# Patient Record
Sex: MALE
[De-identification: names, ages, dates, MRNs, and addresses within clinical notes are randomized per-mention and may not be internally consistent; named-entity substitution may affect disease eponyms.]

---

## 2020-02-22 ENCOUNTER — HOSPITAL ENCOUNTER (INPATIENT)
Dept: HOSPITAL 53 - M ED | Age: 20
LOS: 9 days | Discharge: HOME | DRG: 885 | End: 2020-03-02
Attending: PSYCHIATRY & NEUROLOGY | Admitting: PSYCHIATRY & NEUROLOGY
Payer: COMMERCIAL

## 2020-02-22 VITALS — WEIGHT: 138.23 LBS | BODY MASS INDEX: 23.03 KG/M2 | HEIGHT: 65 IN

## 2020-02-22 VITALS — DIASTOLIC BLOOD PRESSURE: 91 MMHG | SYSTOLIC BLOOD PRESSURE: 157 MMHG

## 2020-02-22 DIAGNOSIS — Z79.899: ICD-10-CM

## 2020-02-22 DIAGNOSIS — Z62.810: ICD-10-CM

## 2020-02-22 DIAGNOSIS — G47.00: ICD-10-CM

## 2020-02-22 DIAGNOSIS — Z91.5: ICD-10-CM

## 2020-02-22 DIAGNOSIS — F43.12: ICD-10-CM

## 2020-02-22 DIAGNOSIS — F33.1: Primary | ICD-10-CM

## 2020-02-22 LAB
ALBUMIN SERPL BCG-MCNC: 4.4 GM/DL (ref 3.2–5.2)
ALT SERPL W P-5'-P-CCNC: 31 U/L (ref 12–78)
AMPHETAMINES UR QL SCN: NEGATIVE
APAP SERPL-MCNC: < 2 UG/ML (ref 10–30)
BARBITURATES UR QL SCN: NEGATIVE
BENZODIAZ UR QL SCN: NEGATIVE
BILIRUB CONJ SERPL-MCNC: 0.2 MG/DL (ref 0–0.2)
BILIRUB SERPL-MCNC: 0.7 MG/DL (ref 0.2–1)
BUN SERPL-MCNC: 13 MG/DL (ref 7–18)
BZE UR QL SCN: NEGATIVE
CALCIUM SERPL-MCNC: 8.7 MG/DL (ref 8.5–10.1)
CANNABINOIDS UR QL SCN: NEGATIVE
CHLORIDE SERPL-SCNC: 106 MEQ/L (ref 98–107)
CO2 SERPL-SCNC: 29 MEQ/L (ref 21–32)
CREAT SERPL-MCNC: 0.91 MG/DL (ref 0.7–1.3)
ETHANOL SERPL-MCNC: < 0.003 % (ref 0–0.01)
GLUCOSE SERPL-MCNC: 91 MG/DL (ref 70–100)
HCT VFR BLD AUTO: 45 % (ref 42–52)
HGB BLD-MCNC: 15.5 G/DL (ref 13.5–17.5)
MCH RBC QN AUTO: 30.7 PG (ref 27–33)
MCHC RBC AUTO-ENTMCNC: 34.4 G/DL (ref 32–36.5)
MCV RBC AUTO: 89.1 FL (ref 80–96)
METHADONE UR QL SCN: NEGATIVE
OPIATES UR QL SCN: NEGATIVE
PCP UR QL SCN: NEGATIVE
PLATELET # BLD AUTO: 289 10^3/UL (ref 150–450)
POTASSIUM SERPL-SCNC: 4 MEQ/L (ref 3.5–5.1)
PROT SERPL-MCNC: 8 GM/DL (ref 6.4–8.2)
RBC # BLD AUTO: 5.05 10^6/UL (ref 4.3–6.1)
SALICYLATES SERPL-MCNC: < 1.7 MG/DL (ref 5–30)
SODIUM SERPL-SCNC: 140 MEQ/L (ref 136–145)
TSH SERPL DL<=0.005 MIU/L-ACNC: 0.99 UIU/ML (ref 0.46–3.98)
WBC # BLD AUTO: 5.8 10^3/UL (ref 4–10)

## 2020-02-22 RX ADMIN — SERTRALINE HYDROCHLORIDE SCH MG: 50 TABLET ORAL at 22:07

## 2020-02-23 VITALS — SYSTOLIC BLOOD PRESSURE: 140 MMHG | DIASTOLIC BLOOD PRESSURE: 78 MMHG

## 2020-02-23 VITALS — SYSTOLIC BLOOD PRESSURE: 150 MMHG | DIASTOLIC BLOOD PRESSURE: 83 MMHG

## 2020-02-23 RX ADMIN — SERTRALINE HYDROCHLORIDE SCH MG: 50 TABLET ORAL at 20:39
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
## 2020-02-23 NOTE — HPEPDOC
[facility] Medical History & Physical


Date of Admission


Feb 23, 2020


Date of Service:  Feb 23, 2020





History and Physical


CHIEF COMPLAINT: Medical H&P for inpatient mental health unit patient





HISTORY OF PRESENT ILLNESS: 18 yo male for suicidal ideation, no medical 

complaints at this time, denies any medical history.





PAST MEDICAL HISTORY:


Denies





PAST SURGICAL HISTORY:


Denies





ALLERGIES: Please see below.





REVIEW OF SYSTEMS:


Negative except as per HPI.





HOME MEDICATIONS: Please see below. 





PHYSICAL EXAM


Vital Signs: See below


General: NAD, lying comfortably in bed


HEENT: NC/AT, EOMI, PERRL


Lungs: CTA B/L


Heart: +S1S2, RRR


Abd: soft, NT, +BS


Ext: no edema


Neuro: no gross focal deficits


Psych: AAOx3





LABORATORY DATA: See below.





MICROBIOLOGY: Please see below. 





ASSESSMENT: 18 yo male admitted to [facility] for suicidal ideation, hospitalist 

consulted for medical co-management.





#suicidal ideation


- continue to follow as per primary team - psychiatry





#DVT prophylaxis


- not indicated





Vital Signs





Vital Signs








  Date Time  Temp Pulse Resp B/P (MAP) Pulse Ox O2 Delivery O2 Flow Rate FiO2


 


2/23/20 11:08 99.5 57 16 150/83 98 Room Air  











Laboratory Data


Labs 24H


Laboratory Tests 2


2/22/20 14:15: 


Nucleated Red Blood Cells % (auto) 0.0, Anion Gap 5L, Calcium Level 8.7, Total 

Bilirubin 0.7, Direct Bilirubin 0.2, Aspartate Amino Transf (AST/SGOT) 20, 

Alanine Aminotransferase (ALT/SGPT) 31, Alkaline Phosphatase 83, Total Protein 

8.0, Albumin 4.4, Albumin/Globulin Ratio 1.22, Thyroid Stimulating Hormone (TSH)

0.990, Salicylates Level < 1.7L, Urine Opiates Screen NEGATIVE, Urine Methadone 

Screen NEGATIVE, Acetaminophen Level < 2.0L, Urine Barbiturates Screen NEGATIVE,

Urine Phencyclidine Screen NEGATIVE, Urine Amphetamines Screen NEGATIVE, Urine 

Benzodiazepines Screen NEGATIVE, Urine Cocaine Metabolite Screen NEGATIVE, Urine

Cannabinoids Screen NEGATIVE, Ethyl Alcohol Level < 0.003


CBC/BMP


Laboratory Tests


2/22/20 14:15











Home Medications


Scheduled


Oseltamivir Phosphate (Oseltamivir Phosphate) 75 Mg Capsule, 75 MG PO DAILY for 

flu


Sertraline HCl (Sertraline HCl) 50 Mg Tablet, 50 MG PO QHS for mood





Allergies


Coded Allergies:  


     No Known Allergies (Unverified , 2/22/20)





A-FIB/CHADSVASC


A-FIB History


Current/History of A-Fib/PAF?:  No











ZACHARY CARUSO MD              Feb 23, 2020 12:06

## 2020-02-24 VITALS — DIASTOLIC BLOOD PRESSURE: 90 MMHG | SYSTOLIC BLOOD PRESSURE: 150 MMHG

## 2020-02-24 VITALS — SYSTOLIC BLOOD PRESSURE: 134 MMHG | DIASTOLIC BLOOD PRESSURE: 68 MMHG

## 2020-02-24 RX ADMIN — SERTRALINE HYDROCHLORIDE SCH MG: 50 TABLET ORAL at 20:06

## 2020-02-24 RX ADMIN — RAMELTEON SCH MG: 8 TABLET ORAL at 20:06

## 2020-02-24 NOTE — MHIPNPDOC
[facility] Progress Note


Progress Note





Colten Vines





Inpatient Progress Note


Colten Vines


Select Gender


MRN: N/[id number]


Date of Birth: [birth year]


Date of Service: 02/24/2020


History of Present Illness


Patient is a 19 -year-old , male, who as per ED report " Pt presented to

ED with his [facility] SGt. after stating +SI and needed help. Pt


reported, made several attempts since age 6. Pt stated last attempt was a week 

ago when he "bashed" his head against wall and kocked himself. "Didn't care if I

woke up". "Pt attempted SI by overdose (Nyquil & Ibeprofin) 2 weeks ago. "I get 

mad when I wake in morning, damn I'm still alive". Pt reported thoughts of SI 

daily, some thoughts more intense than other times. Pt stated, self conscious, 

low self-esteem. "I see myself as worthless and have no use", since age 6-7. 

According to Pt, attempted to seek help @  [facility]. Had an apt for 2/27/2020, 

they cancelled it. Next apt is scheduled for 3/18/2020. Pt stated, has 2 SI 

attempts at age 10y/o and 11 y/o, was never reported".





Interval History


Narrative: The patient has met with today. He reports that he still feels down.





Affective: The patient still reports low mood, loss of interest and difficulty 

focusing on goals.





Psychotic: Denies any symptoms.





Anxiety: Situational worries.





Eating and sleeping behaviors: Some disruption in sleeping, reports trazodone 

not helpful. Eating behavior is normal.





Group Attendance: Attends infrequently.





Medication Side effects: See ROS below





Behavioral problems/significant events overnight: None reported.





Staff Report: Generally guarded, but amenable upon approach.





Review Of Systems


General: Denies fever or appetite changes 





Cardiovascular: Denies Chest pain or palpations





GI: Denies Nausea, vomiting, or bowel changes





Respiratory: Denies shortness of breath or cough





Neuro: Denies dizziness, tremors





Derm: Denies any rashes or pruritus





: Denies any dysuria or urinary problems 





MSK: Denies any muscle tightness or stiffness





HEENT: Denies any vision changes or headaches





Psychotherapy


None on this visit.





Vital Signs


Reviewed.





Mental Status Examination


General: Well dressed with good hygiene





Speech: Spontaneous and fluid





Thought processes: Linear





MSK: Smooth and coordinated gait, no signs of tremors or involuntary orofacial 

movements





Thought content: Hopeless





Abstract reasoning, and computation: Intact





Description of associations: Intact





Description of abnormal or psychotic thoughts: Denies any suicidal or homicidal 

ideation. Denies any auditory or visual hallucinations. Does not appear to be 

responding to internal stimuli. Does not appear to be endorsing any bizarre or 

paranoid ideation.





Judgment: Impaired





Insight: Impaired





Orientation: Alert and orientated 3





Cognition: Grossly normal





Recent and remote memory: Intact





Attention span and concentration: Intact





Fund of knowledge: Adequate





Mood: "fine"





Affect: Dysthymic with a constricted range





Diagnoses


MDD, recurrent, moderate-to-severe.





PTSD, chronic.





Assessment and Plan


MDD/PTSD: Continue sertraline 50 mg daily.





Disposition


Patient will need a further inpatient admission for titration of his 

medications, his symptoms are quite severe and thus necessitate further 

treatment due to multiple suicide attempts.





Time Spent


15 minutes.





Vital Signs





Vital Signs








  Date Time  Temp Pulse Resp B/P (MAP) Pulse Ox O2 Delivery O2 Flow Rate FiO2


 


2/24/20 06:46 98.8 70 16 134/68 (90)  Room Air  


 


2/23/20 11:08     98   











Current Medications





Current Medications








 Medications


  (Trade)  Dose


 Ordered  Sig/Christophe


 Route


 PRN Reason  Start Time


 Stop Time Status Last Admin


Dose Admin


 


 Acetaminophen


  (Tylenol Tab)  650 mg  Q6HP  PRN


 PO


 HEADACHE or DISCOMFORT  2/22/20 16:15


     





 


 Al Hydrox/Mg


 Hydrox/Simethicone


  (Mylanta)  30 ml  Q4HP  PRN


 PO


 HEARTBURN/INDIGESTION  2/22/20 16:15


     





 


 Home Med


  (Med Rec


 Complete!)    ASDIRECTED


 XX


   2/22/20 16:45


 2/22/20 16:41 DC  





 


 Hydroxyzine HCl


  (Atarax)  25 mg  Q4HP  PRN


 PO


 ANXIETY  2/22/20 16:15


     





 


 Magnesium


 Hydroxide


  (Milk Of


 Magnesia)  30 ml  DAILYPRN  PRN


 PO


 CONSTIPATION  2/22/20 16:15


     





 


 Sertraline HCl


  (Zoloft)  50 mg  QHS


 PO


   2/22/20 21:00


    2/23/20 20:39





 


 Trazodone HCl


  (Desyrel)  50 mg  QHSP  PRN


 PO


 INSOMNIA  2/22/20 16:15


    2/23/20 21:21














Allergies


Coded Allergies:  


     No Known Allergies (Unverified , 2/22/20)











KONRAD LOVING DO              Feb 24, 2020 08:41

## 2020-02-25 VITALS — SYSTOLIC BLOOD PRESSURE: 153 MMHG | DIASTOLIC BLOOD PRESSURE: 90 MMHG

## 2020-02-25 VITALS — SYSTOLIC BLOOD PRESSURE: 143 MMHG | DIASTOLIC BLOOD PRESSURE: 76 MMHG

## 2020-02-25 RX ADMIN — SERTRALINE HYDROCHLORIDE SCH MG: 50 TABLET ORAL at 20:58

## 2020-02-25 RX ADMIN — RAMELTEON SCH MG: 8 TABLET ORAL at 20:58

## 2020-02-25 RX ADMIN — OSELTAMIVIR PHOSPHATE SCH MG: 75 CAPSULE ORAL at 15:52

## 2020-02-25 NOTE — MHIPN
DATE:  02/25/2020

 

The patient states, "I'm doing fine." He feels it is because being in the

hospital he is not feeling as stressed out, and therefore, not having any impulse

to self-harm.

 

MENTAL STATUS EXAM: This patient is alert and oriented times three. Eye contact

is fairly good. He is verbally spontaneous. There is no formal thought disorder

noted. Mood is "fine." Affect restricted but appropriate. He is not psychotic.

Denying being suicidal or homicidal. Concentration is fair. Memory intact.

Insight and judgment fair.

 

DIAGNOSIS:

Major depressive disorder, recurrent, moderate to severe.

Post-traumatic stress disorder, chronic.

 

TREATMENT PLAN: We will continue to monitor the patient for continued elevation

and stabilization of his mood and continued resolution of suicidal ideations.

## 2020-02-25 NOTE — IPNPDOC
Date Seen


The patient was seen on 2/25/20.





Progress Note


We were recalled because there is a flu outbreak in inpatient mental health unit

and patient is requesting prophylactic Tamiflu. Patient seen, comfortable in 

bed, asymptomatic, denies any short of breath, cough, nausea, vomiting, myalgia,

arthralgia, or fever. Patient would like Tamiflu. Patient has no complaints at 

this time





PHYSICAL EXAMINATION:


VITAL SIGNS: Please see below.


GENERAL: No distress


HEENT: Normocephalic, atraumatic, moist mucous membranes


NECK: Supple


CARDIOVASCULAR EXAMINATION: S1, S2, no murmurs


RESPIRATORY EXAMINATION: Clear to auscultation, no wheezing


ABDOMINAL EXAMINATION: Soft, nontender, nondistended, positive bowel sounds


EXTREMITIES: Range of motion intact


SKIN: No rash


NEUROLOGICAL EXAMINATION: Alert and oriented 3, no focal deficits 


PSYCHIATRIC EXAMINATION: Calm and cooperative





Plan:


 Check respiratory viral panel, Tamiflu 75 mg daily for prophylaxis.





VS, I&O, 24H, Fishbone


Vital Signs/I&O





Vital Signs








  Date Time  Temp Pulse Resp B/P (MAP) Pulse Ox O2 Delivery O2 Flow Rate FiO2


 


2/25/20 07:57      Room Air  


 


2/25/20 06:52 97.4 75 16 143/76 (98)    


 


2/23/20 11:08     98   

















GONDAL,KHUBAIB N. MD           Feb 25, 2020 15:28

## 2020-02-26 VITALS — SYSTOLIC BLOOD PRESSURE: 163 MMHG | DIASTOLIC BLOOD PRESSURE: 99 MMHG

## 2020-02-26 VITALS — SYSTOLIC BLOOD PRESSURE: 141 MMHG | DIASTOLIC BLOOD PRESSURE: 82 MMHG

## 2020-02-26 VITALS — DIASTOLIC BLOOD PRESSURE: 90 MMHG | SYSTOLIC BLOOD PRESSURE: 130 MMHG

## 2020-02-26 RX ADMIN — SERTRALINE HYDROCHLORIDE SCH MG: 50 TABLET ORAL at 21:45

## 2020-02-26 RX ADMIN — OSELTAMIVIR PHOSPHATE SCH MG: 75 CAPSULE ORAL at 10:39

## 2020-02-26 NOTE — MHIPNPDOC
[facility] Progress Note


Progress Note





Colten Vines





Inpatient Progress Note


Colten Vines


Select Gender


MRN: N/[id number]


Date of Birth: [birth year]YYYY


Date of Service: 02/26/2020


History of Present Illness


Patient is a 19 -year-old , male, who as per ED report " Pt presented to

ED with his [facility] SGt. after stating +SI and needed help. Pt


reported, made several attempts since age 6. Pt stated last attempt was a week 

ago when he "bashed" his head against wall and kocked himself. "Didn't care if I

woke up". "Pt attempted SI by overdose (Nyquil & Ibeprofin) 2 weeks ago. "I get 

mad when I wake in morning, damn I'm still alive". Pt reported thoughts of SI 

daily, some thoughts more intense than other times. Pt stated, self conscious, 

low self-esteem. "I see myself as worthless and have no use", since age 6-7. 

According to Pt, attempted to seek help @  [facility]. Had an apt for 2/27/2020, 

they cancelled it. Next apt is scheduled for 3/18/2020. Pt stated, has 2 SI 

attempts at age 12y/o and 13 y/o, was never reported".





Interval History


Narrative: The patient is met with in the group setting. He reports that he is 

feeling much improved and is feeling ready to go home soon.





Affective: The patient reports improved low mood, no loss of interest and 

improved focus and concentration. 





Psychotic: Denies any symptoms.





Anxiety: The patient reports no situational worries at this time.





Eating and sleeping behaviors: Normalizing.





Group Attendance: Attends more infrequently.





Medication Side effects: See ROS below





Behavioral problems/significant events overnight: None reported.





Staff Report: More friendly and amenable





Review Of Systems


General: Denies fever or appetite changes 





Cardiovascular: Denies chest pain or palpitations





GI: Denies Nausea, vomiting, or bowel changes





Respiratory: Denies shortness of breath or cough





Neuro: Denies dizziness, tremors





Derm: Denies any rashes or pruritus





: Denies any dysuria or urinary problems 





MSK: Denies any muscle tightness or stiffness





HEENT: Denies any vision changes or headaches





Psychotherapy


None on this visit.





Vital Signs


Reviewed.





Mental Status Examination


General: Well dressed with good hygiene





Speech: Spontaneous and fluid





Thought processes: Linear and logical





MSK: Smooth and coordinated gait, no signs of tremors or involuntary orofacial 

movements





Thought content: Future orientated





Abstract reasoning, and computation: Intact





Description of associations: Intact





Description of abnormal or psychotic thoughts: Denies any suicidal or homicidal 

ideation. Denies any auditory or visual hallucinations. Does not appear to be 

responding to internal stimuli. Does not appear to be endorsing any bizarre or 

paranoid ideation.





Judgment: fair





Insight: fair





Orientation: Alert and orientated 3





Cognition: Grossly normal





Recent and remote memory: Intact





Attention span and concentration: Intact





Fund of knowledge: Adequate





Mood: "okay"





Affect: Euthymic with a full range





Diagnoses


MDD, recurrent, moderate-to-severe in full remission.





PTSD, chronic.





Assessment and Plan


MDD/PTSD: Continue medications as below.





Disposition


Discharge tomorrow to chain of command pending weather.





Time Spent


15 minutes.





Vital Signs





Vital Signs








  Date Time  Temp Pulse Resp B/P (MAP) Pulse Ox O2 Delivery O2 Flow Rate FiO2


 


2/26/20 06:49 98.0 62 16 141/82 (101)    


 


2/25/20 07:57      Room Air  


 


2/23/20 11:08     98   











Current Medications





Current Medications








 Medications


  (Trade)  Dose


 Ordered  Sig/Christophe


 Route


 PRN Reason  Start Time


 Stop Time Status Last Admin


Dose Admin


 


 Acetaminophen


  (Tylenol Tab)  650 mg  Q6HP  PRN


 PO


 HEADACHE or DISCOMFORT  2/22/20 16:15


     





 


 Al Hydrox/Mg


 Hydrox/Simethicone


  (Mylanta)  30 ml  Q4HP  PRN


 PO


 HEARTBURN/INDIGESTION  2/22/20 16:15


     





 


 Home Med


  (Med Rec


 Complete!)    ASDIRECTED


 XX


   2/22/20 16:45


 2/22/20 16:41 DC  





 


 Hydroxyzine HCl


  (Atarax)  25 mg  Q4HP  PRN


 PO


 ANXIETY  2/22/20 16:15


     





 


 Magnesium


 Hydroxide


  (Milk Of


 Magnesia)  30 ml  DAILYPRN  PRN


 PO


 CONSTIPATION  2/22/20 16:15


     





 


 Oseltamivir


 Phosphate


  (Tamiflu)  75 mg  DAILY


 PO


   2/25/20 09:00


    2/25/20 15:52





 


 Ramelteon


  (Rozerem)  8 mg  QHS


 PO


   2/24/20 21:00


    2/25/20 20:58





 


 Sertraline HCl


  (Zoloft)  50 mg  QHS


 PO


   2/22/20 21:00


    2/25/20 20:58





 


 Trazodone HCl


  (Desyrel)  50 mg  QHSP  PRN


 PO


 INSOMNIA  2/22/20 16:15


 2/24/20 10:24 DC 2/23/20 21:21














Allergies


Coded Allergies:  


     No Known Allergies (Unverified , 2/22/20)











KONRAD LOVING DO              Feb 26, 2020 10:01

## 2020-02-27 VITALS — DIASTOLIC BLOOD PRESSURE: 80 MMHG | SYSTOLIC BLOOD PRESSURE: 154 MMHG

## 2020-02-27 VITALS — DIASTOLIC BLOOD PRESSURE: 77 MMHG | SYSTOLIC BLOOD PRESSURE: 133 MMHG

## 2020-02-27 RX ADMIN — SERTRALINE HYDROCHLORIDE SCH MG: 50 TABLET ORAL at 20:02

## 2020-02-27 RX ADMIN — OSELTAMIVIR PHOSPHATE SCH MG: 75 CAPSULE ORAL at 10:41

## 2020-02-27 NOTE — MHIPNPDOC
[facility] [place] Progress Note


Progress Note





Colten Vines





Inpatient Progress Note


Colten Vines


Select Gender


MRN: [id number]


Date of Birth: [birth year]YY[birth year]


Date of Service: 02/27/2020


History of Present Illness


Patient is a 19 -year-old , male, who as per ED report " Pt presented to

ED with his [facility] SGt. after stating +SI and needed help. Pt


reported, made several attempts since age 6. Pt stated last attempt was a week 

ago when he "bashed" his head against wall and kocked himself. "Didn't care if I

woke up". "Pt attempted SI by overdose (Nyquil & Ibeprofin) 2 weeks ago. "I get 

mad when I wake in morning, damn I'm still alive". Pt reported thoughts of SI 

daily, some thoughts more intense than other times. Pt stated, self conscious, 

low self-esteem. "I see myself as worthless and have no use", since age 6-7. 

According to Pt, attempted to seek help @  [facility]. Had an apt for 2/27/2020, 

they cancelled it. Next apt is scheduled for 3/18/2020. Pt stated, has 2 SI 

attempts at age 12y/o and 11 y/o, was never reported".





Interval History


Narrative: The patient is met within the group setting. He reports that he feels

improved and is somewhat upset about being unable to return home due to weather.





Affective: The patient denies any depressive symptoms.





Psychotic: Denies any symptoms.





Anxiety: The patient reports no situational worries at this time.





Eating and sleeping behaviors: Normalizing.





Group Attendance: Attends more infrequently.





Medication Side effects: See ROS below





Behavioral problems/significant events overnight: None reported.





Staff Report: More friendly and amenable.





Review Of Systems


General: Denies fever or appetite changes 





Cardiovascular: Denies Chest pain or palpations





GI: Denies Nausea, vomiting, or bowel changes





Respiratory: Denies shortness of breath or cough





Neuro: Denies dizziness, tremors





Derm: Denies any rashes or pruritus





: Denies any dysuria or urinary problems 





MSK: Denies any muscle tightness or stiffness





HEENT: Denies any vision changes or headaches





Psychotherapy


None on this visit.





Vital Signs


Reviewed.





Mental Status Examination


General: Well dressed with good hygiene





Speech: Spontaneous and fluid





Thought processes: Linear and logical





MSK: Smooth and coordinated gait, no signs of tremors or involuntary orofacial 

movements





Thought content: Future orientated





Abstract reasoning, and computation: Intact





Description of associations: Intact





Description of abnormal or psychotic thoughts: Denies any suicidal or homicidal 

ideation. Denies any auditory or visual hallucinations. Does not appear to be 

responding to internal stimuli. Does not appear to be endorsing any bizarre or 

paranoid ideation.





Judgment: fair





Insight: fair





Orientation: Alert and orientated 3





Cognition: Grossly normal





Recent and remote memory: Intact





Attention span and concentration: Intact





Fund of knowledge: Adequate





Mood: "okay"





Affect: Euthymic with a full range





Diagnoses


MDD, recurrent, moderate-to-severe in full remission.





PTSD, chronic.





Assessment and Plan


MDD/PTSD: Continue medications as below.





Disposition


The patient will be discharged to [facility], once they reopen 

after the inclement weather has resolved.





Time Spent


15 minutes.





Vital Signs





Vital Signs








  Date Time  Temp Pulse Resp B/P (MAP) Pulse Ox O2 Delivery O2 Flow Rate FiO2


 


2/27/20 06:34 98.5 64 16 133/77 (95)    


 


2/25/20 07:57      Room Air  


 


2/23/20 11:08     98   











Current Medications





Current Medications








 Medications


  (Trade)  Dose


 Ordered  Sig/Christophe


 Route


 PRN Reason  Start Time


 Stop Time Status Last Admin


Dose Admin


 


 Acetaminophen


  (Tylenol Tab)  650 mg  Q6HP  PRN


 PO


 HEADACHE or DISCOMFORT  2/22/20 16:15


     





 


 Al Hydrox/Mg


 Hydrox/Simethicone


  (Mylanta)  30 ml  Q4HP  PRN


 PO


 HEARTBURN/INDIGESTION  2/22/20 16:15


     





 


 Home Med


  (Med Rec


 Complete!)    ASDIRECTED


 XX


   2/22/20 16:45


 2/22/20 16:41 DC  





 


 Hydroxyzine HCl


  (Atarax)  25 mg  Q4HP  PRN


 PO


 ANXIETY  2/22/20 16:15


 2/26/20 10:37 DC  





 


 Hydroxyzine HCl


  (Atarax)  25 mg  QHSP  PRN


 PO


 sleep  2/26/20 20:00


    2/26/20 21:45





 


 Magnesium


 Hydroxide


  (Milk Of


 Magnesia)  30 ml  DAILYPRN  PRN


 PO


 CONSTIPATION  2/22/20 16:15


     





 


 Oseltamivir


 Phosphate


  (Tamiflu)  75 mg  DAILY


 PO


   2/25/20 09:00


    2/26/20 10:39





 


 Ramelteon


  (Rozerem)  8 mg  QHS


 PO


   2/24/20 21:00


 2/26/20 10:36 DC 2/25/20 20:58





 


 Sertraline HCl


  (Zoloft)  50 mg  QHS


 PO


   2/22/20 21:00


    2/26/20 21:45





 


 Trazodone HCl


  (Desyrel)  50 mg  QHSP  PRN


 PO


 INSOMNIA  2/22/20 16:15


 2/24/20 10:24 DC 2/23/20 21:21














Allergies


Coded Allergies:  


     No Known Allergies (Unverified , 2/22/20)











KONRAD LOVING DO              Feb 27, 2020 08:45

## 2020-02-28 VITALS — DIASTOLIC BLOOD PRESSURE: 80 MMHG | SYSTOLIC BLOOD PRESSURE: 143 MMHG

## 2020-02-28 VITALS — SYSTOLIC BLOOD PRESSURE: 132 MMHG | DIASTOLIC BLOOD PRESSURE: 87 MMHG

## 2020-02-28 RX ADMIN — OSELTAMIVIR PHOSPHATE SCH MG: 75 CAPSULE ORAL at 10:29

## 2020-02-28 RX ADMIN — SERTRALINE HYDROCHLORIDE SCH MG: 50 TABLET ORAL at 21:12

## 2020-02-28 NOTE — MHDSPDOC
[facility] Discharge Summary


Discharge Summary


DATE OF ADMISSION: Feb 22, 2020 at 16:11 


DATE OF DISCHARGE: 3/2/20





Colten Vines





Discharge


Colten Vines


Select Gender


MRN: N/[id number]


Date of Birth: [birth year]YYYY


Date of Service: 02/28/2020


Diagnoses





MDD, recurrent, moderate-to-severe in full remission.


PTSD, chronic.


History of Present Illness





Patient is a 19 -year-old , male, who as per ED report " Pt presented to

ED with his [facility] SGt. after stating +SI and needed help. Pt


reported, made several attempts since age 6. Pt stated last attempt was a week 

ago when he "bashed" his head against wall and kocked himself. "Didn't care if I

woke up". "Pt attempted SI by overdose (Nyquil & Ibeprofin) 2 weeks ago. "I get 

mad when I wake in morning, damn I'm still alive". Pt reported thoughts of SI 

daily, some thoughts more intense than other times. Pt stated, self conscious, 

low self-esteem. "I see myself as worthless and have no use", since age 6-7. 

According to Pt, attempted to seek help @ [facility]. Had an apt for 2/27/2020, 

they cancelled it. Next apt is scheduled for 3/18/2020. Pt stated, has 2 SI 

attempts at age 12y/o and 13 y/o, was never reported".


Consultants Involved


Hospitalist/PCP screening


Treatment and Progress On The Unit


The patient was admitted to the inpatient mental health unit after multiple 

suicidal gestures. The patient was then started on sertraline with positive 

effects. He was titrated to a sufficient dose, where he was able to engage well 

on our unit. He resolved well with the supportive environment, had no behavioral

problems and generally engaged well with our treatment team. The patient after 

resolution of his depression symptoms, was slated for discharge, however, 

inclement weather made it difficult for the patient to be discharged, and thus 

he was needed to be retained over the weekend. However, as the patient is ready 

to be discharged his discharge reprocessed and he would be picked up by his 

chain of command once his chain of command is able to after the weather clears.


Discharge Assessment


19-year-old man with likely moderate-to-severe depression, put into remission by

sufficient dose SSRI on top of a history of trauma, does well on our unit with 

sufficient first-line treatments. His discharge was processed on the date of the

service, and he will be collected by his chain of command once weather permits.


The patient at the time of discharge did not meet criteria for involuntary 

admission/extension due to having a normal mental status exam, fair insight into

the situation, They are engaged in the discharge process, as well as being 

friendly and amenable in behavioral control and havent been engaging in any 

observed concerning behavior or ideation recently. They decline voluntary 

extension/admission at this time and must be discharged in good henri, as Im 

unable to make a case for holding the patient against their will. They may have 

historical risk factors of admissions and other interactions with psychiatry 

however, those are not modifiable from a clinical perspective. The patient will 

need to be discharged in good henri.


Mental Status Examination


General: Well dressed with good hygiene


Speech: Spontaneous and fluid


Thought processes: Linear and logical


MSK: Smooth and coordinated gait, no signs of tremors or involuntary orofacial 

movements


Thought content: Future orientated


Abstract reasoning, and computation: Intact


Description of associations: Intact


Description of abnormal or psychotic thoughts: Denies any suicidal or homicidal 

ideation. Denies any auditory or visual hallucinations. Does not appear to be 

responding to internal stimuli. Does not appear to be endorsing any bizarre or 

paranoid ideation.


Judgment: fair


Insight: fair


Orientation: Alert and orientated 3


Cognition: Grossly normal


Recent and remote memory: Intact


Attention span and concentration: Intact


Fund of knowledge: Adequate


Mood: "okay"


Affect: Euthymic with a full range


Follow Up








The social work team worked during the predischarge meeting in order to evaluate

for further issues of lethality address them fully before discharge. They worked

on safety planning with the patient's family members in order to ensure that the

patient will have a safe and effective discharge.


Time Spent


The amount of time spent in the coordination of care for this patient was 

approximately 50 minutes.





Vital Signs/I&Os





Vital Signs








  Date Time  Temp Pulse Resp B/P (MAP) Pulse Ox O2 Delivery O2 Flow Rate FiO2


 


2/28/20 06:23 98.1 82 16 143/80 (101)    


 


2/25/20 07:57      Room Air  


 


2/23/20 11:08     98   











Laboratory Data


Microbiology





Microbiology


2/25/20 Respiratory Virus Panel (PCR) ([facility]) - Final, Complete





Medications


Scheduled


Oseltamivir Phosphate (Oseltamivir Phosphate) 75 Mg Capsule, 75 MG PO DAILY for 

flu for 4 Days, #4


Sertraline HCl (Sertraline HCl) 50 Mg Tablet, 50 MG PO QHS for mood for 7 Days, 

#7





Allergies


Coded Allergies:  


     No Known Allergies (Unverified , 2/22/20)











KONRAD LOVING DO              Feb 28, 2020 09:12

## 2020-02-29 VITALS — DIASTOLIC BLOOD PRESSURE: 92 MMHG | SYSTOLIC BLOOD PRESSURE: 155 MMHG

## 2020-02-29 VITALS — SYSTOLIC BLOOD PRESSURE: 135 MMHG | DIASTOLIC BLOOD PRESSURE: 84 MMHG

## 2020-02-29 RX ADMIN — OSELTAMIVIR PHOSPHATE SCH MG: 75 CAPSULE ORAL at 10:43

## 2020-02-29 RX ADMIN — SERTRALINE HYDROCHLORIDE SCH MG: 50 TABLET ORAL at 21:15

## 2020-03-01 VITALS — DIASTOLIC BLOOD PRESSURE: 95 MMHG | SYSTOLIC BLOOD PRESSURE: 160 MMHG

## 2020-03-01 VITALS — SYSTOLIC BLOOD PRESSURE: 132 MMHG | DIASTOLIC BLOOD PRESSURE: 78 MMHG

## 2020-03-01 RX ADMIN — SERTRALINE HYDROCHLORIDE SCH MG: 50 TABLET ORAL at 21:02

## 2020-03-01 RX ADMIN — OSELTAMIVIR PHOSPHATE SCH MG: 75 CAPSULE ORAL at 09:00

## 2020-03-02 VITALS — SYSTOLIC BLOOD PRESSURE: 146 MMHG | DIASTOLIC BLOOD PRESSURE: 74 MMHG

## 2020-03-02 RX ADMIN — OSELTAMIVIR PHOSPHATE SCH MG: 75 CAPSULE ORAL at 09:16
